# Patient Record
Sex: MALE | Race: BLACK OR AFRICAN AMERICAN | Employment: UNEMPLOYED | URBAN - METROPOLITAN AREA
[De-identification: names, ages, dates, MRNs, and addresses within clinical notes are randomized per-mention and may not be internally consistent; named-entity substitution may affect disease eponyms.]

---

## 2018-02-27 ENCOUNTER — APPOINTMENT (OUTPATIENT)
Dept: GENERAL RADIOLOGY | Age: 23
End: 2018-02-27
Attending: PHYSICIAN ASSISTANT
Payer: SELF-PAY

## 2018-02-27 ENCOUNTER — HOSPITAL ENCOUNTER (EMERGENCY)
Age: 23
Discharge: HOME | End: 2018-02-27
Attending: STUDENT IN AN ORGANIZED HEALTH CARE EDUCATION/TRAINING PROGRAM
Payer: SELF-PAY

## 2018-02-27 VITALS
HEIGHT: 71 IN | OXYGEN SATURATION: 98 % | DIASTOLIC BLOOD PRESSURE: 94 MMHG | SYSTOLIC BLOOD PRESSURE: 167 MMHG | HEART RATE: 81 BPM | BODY MASS INDEX: 33.21 KG/M2 | WEIGHT: 237.25 LBS | TEMPERATURE: 97.9 F | RESPIRATION RATE: 16 BRPM

## 2018-02-27 DIAGNOSIS — S63.502A SPRAIN OF LEFT WRIST, INITIAL ENCOUNTER: Primary | ICD-10-CM

## 2018-02-27 PROCEDURE — 73110 X-RAY EXAM OF WRIST: CPT

## 2018-02-27 PROCEDURE — 74011250637 HC RX REV CODE- 250/637: Performed by: PHYSICIAN ASSISTANT

## 2018-02-27 PROCEDURE — 99283 EMERGENCY DEPT VISIT LOW MDM: CPT

## 2018-02-27 RX ORDER — IBUPROFEN 800 MG/1
800 TABLET ORAL
Qty: 30 TAB | Refills: 0 | Status: SHIPPED | OUTPATIENT
Start: 2018-02-27

## 2018-02-27 RX ORDER — IBUPROFEN 400 MG/1
800 TABLET ORAL
Status: COMPLETED | OUTPATIENT
Start: 2018-02-27 | End: 2018-02-27

## 2018-02-27 RX ADMIN — IBUPROFEN 800 MG: 400 TABLET, FILM COATED ORAL at 12:10

## 2018-02-27 NOTE — DISCHARGE INSTRUCTIONS
Wrist Sprain: Care Instructions  Your Care Instructions    Your wrist hurts because you have stretched or torn ligaments, which connect the bones in your wrist.  Wrist sprains usually take from 2 to 10 weeks to heal, but some take longer. Usually, the more pain you have, the more severe your wrist sprain is and the longer it will take to heal. You can heal faster and regain strength in your wrist with good home treatment. Follow-up care is a key part of your treatment and safety. Be sure to make and go to all appointments, and call your doctor if you are having problems. It's also a good idea to know your test results and keep a list of the medicines you take. How can you care for yourself at home? · Prop up your arm on a pillow when you ice it or anytime you sit or lie down for the next 3 days. Try to keep your wrist above the level of your heart. This will help reduce swelling. · Put ice or cold packs on your wrist for 10 to 20 minutes at a time. Try to do this every 1 to 2 hours for the next 3 days (when you are awake) or until the swelling goes down. Put a thin cloth between the ice pack and your skin. · After 2 or 3 days, if your swelling is gone, apply a heating pad set on low or a warm cloth to your wrist. This helps keep your wrist flexible. Some doctors suggest that you go back and forth between hot and cold. · If you have an elastic bandage, keep it on for the next 24 to 36 hours. The bandage should be snug but not so tight that it causes numbness or tingling. To rewrap the wrist, wrap the bandage around the hand a few times, beginning at the fingers. Then wrap it around the hand between the thumb and index finger, ending by circling the wrist several times. · If your doctor gave you a splint or brace, wear it as directed to protect your wrist until it has healed. · Take pain medicines exactly as directed. ¨ If the doctor gave you a prescription medicine for pain, take it as prescribed.   ¨ If you are not taking a prescription pain medicine, ask your doctor if you can take an over-the-counter medicine. · Try not to use your injured wrist and hand. When should you call for help? Call your doctor now or seek immediate medical care if:  ? · Your hand or fingers are cool or pale or change color. ? Watch closely for changes in your health, and be sure to contact your doctor if:  ? · Your pain gets worse. ? · Your wrist has not improved after 1 week. Where can you learn more? Go to http://mario-monica.info/. Enter G541 in the search box to learn more about \"Wrist Sprain: Care Instructions. \"  Current as of: March 21, 2017  Content Version: 11.4  © 1232-6964 [employer], [employer]. Care instructions adapted under license by [employer] (which disclaims liability or warranty for this information). If you have questions about a medical condition or this instruction, always ask your healthcare professional. [address] any warranty or liability for your use of this information.

## 2018-02-27 NOTE — ED TRIAGE NOTES
Triage Note: Patient complains of left wrist pain after falling while playing basketball. +peripheral pulses.

## 2018-02-27 NOTE — ED PROVIDER NOTES
HPI Comments: 21 y/o left-hand dominant male with no significant PMHx, presenting with complaint of right ear pain and left wrist pain. He states he has had some right-sided ear pain for the past 2 weeks that has been gradually worsening. No associated hearing loss, swelling, redness or drainage from his ear. He denies any trauma to his ear. The patient also complains of left wrist pain. He states he was playing basketball yesterday and fell, landing on his left wrist. He reports having a lot of swelling yesterday, which has improved somewhat today. His pain is 7/10, aching, non-radiating. Aggravating factors include movement; no alleviating factors. He denies weakness, numbness, open wounds, or any other injuries. The history is provided by the patient. History reviewed. No pertinent past medical history. History reviewed. No pertinent surgical history. History reviewed. No pertinent family history. Social History     Social History    Marital status: SINGLE     Spouse name: N/A    Number of children: N/A    Years of education: N/A     Occupational History    Not on file. Social History Main Topics    Smoking status: Current Every Day Smoker     Packs/day: 0.25    Smokeless tobacco: Never Used    Alcohol use No    Drug use: No    Sexual activity: Not on file     Other Topics Concern    Not on file     Social History Narrative    No narrative on file         ALLERGIES: Review of patient's allergies indicates no known allergies. Review of Systems   Constitutional: Negative for chills and fever. HENT: Positive for ear pain. Negative for congestion, ear discharge and hearing loss. Respiratory: Negative for shortness of breath. Cardiovascular: Negative for chest pain. Gastrointestinal: Negative for nausea and vomiting. Musculoskeletal: Positive for arthralgias (left wrist pain). Negative for myalgias. Skin: Negative for wound.    Neurological: Negative for syncope, weakness, light-headedness and numbness. All other systems reviewed and are negative. Vitals:    02/27/18 1154   BP: (!) 167/94   Pulse: 81   Resp: 16   Temp: 97.9 °F (36.6 °C)   SpO2: 98%   Weight: 107.6 kg (237 lb 4 oz)   Height: 5' 11\" (1.803 m)            Physical Exam   Constitutional: He is oriented to person, place, and time. He appears well-developed and well-nourished. No distress. HENT:   Head: Normocephalic and atraumatic. Right Ear: Hearing and external ear normal.   Left Ear: Hearing and external ear normal.   Cerumen occluding external auditory canals bilaterally. Eyes: Conjunctivae and EOM are normal.   Neck: Normal range of motion. Neck supple. Cardiovascular: Normal rate and regular rhythm. Pulses:       Radial pulses are 2+ on the right side, and 2+ on the left side. Pulmonary/Chest: Effort normal. No respiratory distress. Musculoskeletal: Normal range of motion. Left wrist with generalized tenderness to palpation. No appreciable swelling, erythema or deformity. No tenderness of elbow, forearm, hand or fingers. Fingers are warm with intact light touch sensation. Neurological: He is alert and oriented to person, place, and time. Skin: Skin is warm and dry. He is not diaphoretic. Nursing note and vitals reviewed. MDM  Number of Diagnoses or Management Options  Sprain of left wrist, initial encounter:      Amount and/or Complexity of Data Reviewed  Tests in the radiology section of CPT®: ordered and reviewed  Independent visualization of images, tracings, or specimens: yes (XR left wrist)    Patient Progress  Patient progress: stable        ED Course       Procedures      21 y/o left-hand dominant male with no significant PMHx, presenting with complaint of right ear pain and left wrist pain. Physical exam reveals cerumen impaction, likely cause of right ear pain.  No swelling or tenderness of pinna/tragus, no exudate or discharge in external auditory canal. Left wrist physical exam suggestive of sprain, but will obtain XR to evaluate for possible fractures. XR left wrist, read by radiology and independently visualized and interpreted by myself, reveals no evidence of acute fractures or traumatic malalignment. Safe for discharge home with wrist immobilizer and Rx for ibuprofen. Recommended rest, ice and elevation, as well as OTC Debrox for cerumen impaction. The patient was given resources to establish care with a PCP as needed for follow up. Strict ED return precautions discussed and provided in writing at time of discharge. The patient verbalized understanding and agreement with this plan.

## 2018-02-27 NOTE — LETTER
[address] 
[address] [place], [place] [address] [id number] 
[phone] Work/School Note Date: 2/27/2018 To Whom It May concern: 
 
Dudley Aleman was seen and treated today in the emergency room by the following provider(s): 
Attending Provider: Bryanna Baez MD 
Physician Assistant: VIDA Borja. Dudley Aleman may return to work on 3/1/18. Sincerely, VIDA Borja

## 2019-10-24 ENCOUNTER — ED HISTORICAL/CONVERTED ENCOUNTER (OUTPATIENT)
Dept: OTHER | Age: 24
End: 2019-10-24

## 2020-05-07 LAB — SARS-COV-2, NAA: NOT DETECTED

## 2020-05-11 ENCOUNTER — TELEPHONE (OUTPATIENT)
Dept: INTERNAL MEDICINE CLINIC | Age: 25
End: 2020-05-11

## 2020-05-11 ENCOUNTER — VIRTUAL VISIT (OUTPATIENT)
Dept: INTERNAL MEDICINE CLINIC | Age: 25
End: 2020-05-11

## 2020-05-11 VITALS — HEIGHT: 71 IN | WEIGHT: 235 LBS | BODY MASS INDEX: 32.9 KG/M2

## 2020-05-11 DIAGNOSIS — H53.9 VISION CHANGES: ICD-10-CM

## 2020-05-11 DIAGNOSIS — R03.0 ELEVATED BLOOD PRESSURE READING: Primary | ICD-10-CM

## 2020-05-11 NOTE — TELEPHONE ENCOUNTER
Writer spoke with patient and we can for now send a copy of the covid results to his home address and patient states he is not on any medications, that he probably was on these a long time ago back in high school.

## 2020-05-11 NOTE — PROGRESS NOTES
Moody Mcburney is a 30 y.o. male who was seen by synchronous (real-time) audio-video technology on 5/11/2020. Consent: Moody Mcburney, who was seen by synchronous (real-time) audio-video technology, and/or his healthcare decision maker, is aware that this patient-initiated, Telehealth encounter on 5/11/2020 is a billable service, with coverage as determined by his insurance carrier. He is aware that he may receive a bill and has provided verbal consent to proceed: Yes. Assessment & Plan:   Diagnoses and all orders for this visit:    1. Elevated blood pressure reading    2. Vision changes      The patient I spoke about his hospital visit to [facility]. I will try to get that report so that I can see what lab work was done. Also I will get the results of his COVID-19 testing. Patient reports he will need documentation of this in order for him to return back to work. The patient will be contacted with further instructions on how to get a copy of his test results. Meanwhile I have asked the patient to please purchase a blood pressure cuff for at home. I would like for him to take his blood pressure twice a day for the next week and to make a follow-up appointment in a week to discuss his results. Patient has stated today that his vision is back to normal.  Patient reports that he has a very physical job with lots of lifting and pulling and moving throughout the day. Patient admits that his diet is not always the healthiest.  Patient should schedule follow-up appointment in 1 week    I spent at least 45 minutes on this visit with this new patient. ([phone]  Subjective:   Moody Mcburney is a 30 y.o. male who was seen for Establish Care  Patient presents today to get establish as well as to follow-up her recent visit to [facility].  Mr. Zaynab Crowe reports he has not seen a primary care physician since he was probably in high school. At that time he was seen Dr. Tiffani Fletcher.   Chiara Zaynab Crowe reports that he was seen at [facility] last Friday because he was having some lightheadedness as well as blurred vision when looking at things from a distance. He reports that he was checked for COVID-19 and was eventually called and told that he was negative. Patient reports that they did a visual acuity test on him at the hospital as well as they did a chest x-ray and some blood work. He reports that he was told that everything checked out okay and that perhaps he was having the symptoms in relationship to some drugs. Mr. Zaynab Crowe admits that he does smoke marijuana and the last time he had some was about 2 days ago. He reports he has a history of reflux and currently he takes Tums 1-2 times a week. He reports that he is concerned that he may also have hypertension. He states he was told that his blood pressure was elevated when he was at the hospital but he believes it returned to normal by the time he was discharged. Prior to Admission medications    Not on File     No Known Allergies  Past Medical History:   Diagnosis Date    Anxiety     Depression     GERD (gastroesophageal reflux disease)      No past surgical history on file.   Family History   Problem Relation Age of Onset    Depression Mother     Hypertension Mother     Stroke Maternal Uncle     Diabetes Maternal Uncle     Diabetes Cousin      Social History     Socioeconomic History    Marital status: SINGLE     Spouse name: Not on file    Number of children: Not on file    Years of education: Not on file    Highest education level: Not on file   Occupational History     Comment:    Tobacco Use    Smoking status: Current Every Day Smoker     Packs/day: 0.25     Years: 7.00     Pack years: 1.75     Types: Cigarettes     Start date: 5/11/2013    Smokeless tobacco: Never Used   Substance and Sexual Activity    Alcohol use: Not Currently    Drug use: Yes     Frequency: 1.0 times per week     Types: Marijuana    Sexual activity: Yes     Partners: Female     Birth control/protection: None     Comment: using BC         ROS  See above    Objective:     Visit Vitals  Ht 5' 11\" (1.803 m)   Wt 235 lb (106.6 kg)   BMI 32.78 kg/m²      General: alert, cooperative, no distress   Mental  status: normal mood, behavior, speech, dress, motor activity, and thought processes, able to follow commands   HENT: NCAT   Neck: no visualized mass   Resp: no respiratory distress   Neuro: no gross deficits   Skin: no discoloration or lesions of concern on visible areas   Psychiatric: normal affect, consistent with stated mood, no evidence of hallucinations     Additional exam findings: We discussed the expected course, resolution and complications of the diagnosis(es) in detail. Medication risks, benefits, costs, interactions, and alternatives were discussed as indicated. I advised him to contact the office if his condition worsens, changes or fails to improve as anticipated. He expressed understanding with the diagnosis(es) and plan. Sophia Tello is a 30 y.o. male who was evaluated by a video visit encounter for concerns as above. Patient identification was verified prior to start of the visit. A caregiver was present when appropriate. Due to this being a TeleHealth encounter ([facility] public health emergency), evaluation of the following organ systems was limited: Vitals/Constitutional/EENT/Resp/CV/GI//MS/Neuro/Skin/Heme-Lymph-Imm. Pursuant to the emergency declaration under the [facility] waiver authority and the [employer] and [employer] Act, this Virtual  Visit was conducted, with patient's (and/or legal guardian's) consent, to reduce the patient's risk of exposure to COVID-19 and provide necessary medical care. Services were provided through a video synchronous discussion virtually to substitute for in-person clinic visit. Patient and provider were located at their individual homes.       Jaye Metzger PA-C

## 2020-05-11 NOTE — PROGRESS NOTES
Patient tested negative for COVID. No other concerns.  Previous PCP, Dr. Bryan Denson, [place], [place].

## 2020-05-11 NOTE — TELEPHONE ENCOUNTER
Please contact the patient and let him know I have a copy of his covid-19 results. Please find out where he would like for us to send the results. Also ask him about taking fluoxetine, hydroxyzine, or the trazodone.  Has he seen psych in the past? thanks

## 2020-07-08 ENCOUNTER — HOSPITAL ENCOUNTER (INPATIENT)
Age: 25
LOS: 2 days | Discharge: HOME | DRG: 754 | End: 2020-07-10
Attending: PSYCHIATRY & NEUROLOGY | Admitting: PSYCHIATRY & NEUROLOGY
Payer: MEDICAID

## 2020-07-08 ENCOUNTER — HOSPITAL ENCOUNTER (EMERGENCY)
Age: 25
Discharge: ANOTHER HOSPITAL | End: 2020-07-08
Attending: EMERGENCY MEDICINE | Admitting: EMERGENCY MEDICINE
Payer: MEDICAID

## 2020-07-08 VITALS
DIASTOLIC BLOOD PRESSURE: 88 MMHG | OXYGEN SATURATION: 99 % | TEMPERATURE: 98.1 F | SYSTOLIC BLOOD PRESSURE: 128 MMHG | RESPIRATION RATE: 16 BRPM | HEART RATE: 69 BPM

## 2020-07-08 DIAGNOSIS — F32.A DEPRESSION, UNSPECIFIED DEPRESSION TYPE: Primary | ICD-10-CM

## 2020-07-08 LAB
ALBUMIN SERPL-MCNC: 4.3 G/DL (ref 3.5–5)
ALBUMIN/GLOB SERPL: 1.1 {RATIO} (ref 1.1–2.2)
ALP SERPL-CCNC: 80 U/L (ref 45–117)
ALT SERPL-CCNC: 37 U/L (ref 12–78)
AMPHET UR QL SCN: NEGATIVE
ANION GAP SERPL CALC-SCNC: 6 MMOL/L (ref 5–15)
APAP SERPL-MCNC: <2 UG/ML (ref 10–30)
APPEARANCE UR: CLEAR
AST SERPL-CCNC: NORMAL U/L (ref 15–37)
BACTERIA URNS QL MICRO: NEGATIVE /HPF
BARBITURATES UR QL SCN: NEGATIVE
BASOPHILS # BLD: 0 K/UL (ref 0–0.1)
BASOPHILS NFR BLD: 0 % (ref 0–1)
BENZODIAZ UR QL: NEGATIVE
BILIRUB SERPL-MCNC: 0.5 MG/DL (ref 0.2–1)
BILIRUB UR QL: NEGATIVE
BUN SERPL-MCNC: 15 MG/DL (ref 6–20)
BUN/CREAT SERPL: 15 (ref 12–20)
CALCIUM SERPL-MCNC: 9.1 MG/DL (ref 8.5–10.1)
CANNABINOIDS UR QL SCN: POSITIVE
CHLORIDE SERPL-SCNC: 107 MMOL/L (ref 97–108)
CO2 SERPL-SCNC: 23 MMOL/L (ref 21–32)
COCAINE UR QL SCN: NEGATIVE
COLOR UR: NORMAL
CREAT SERPL-MCNC: 1.03 MG/DL (ref 0.7–1.3)
DIFFERENTIAL METHOD BLD: NORMAL
DRUG SCRN COMMENT,DRGCM: ABNORMAL
EOSINOPHIL # BLD: 0 K/UL (ref 0–0.4)
EOSINOPHIL NFR BLD: 0 % (ref 0–7)
EPITH CASTS URNS QL MICRO: NORMAL /LPF
ERYTHROCYTE [DISTWIDTH] IN BLOOD BY AUTOMATED COUNT: 13.3 % (ref 11.5–14.5)
ETHANOL SERPL-MCNC: <10 MG/DL
GLOBULIN SER CALC-MCNC: 3.8 G/DL (ref 2–4)
GLUCOSE SERPL-MCNC: 95 MG/DL (ref 65–100)
GLUCOSE UR STRIP.AUTO-MCNC: NEGATIVE MG/DL
HCT VFR BLD AUTO: 46.4 % (ref 36.6–50.3)
HGB BLD-MCNC: 15 G/DL (ref 12.1–17)
HGB UR QL STRIP: NEGATIVE
HYALINE CASTS URNS QL MICRO: NORMAL /LPF (ref 0–5)
IMM GRANULOCYTES # BLD AUTO: 0 K/UL (ref 0–0.04)
IMM GRANULOCYTES NFR BLD AUTO: 0 % (ref 0–0.5)
KETONES UR QL STRIP.AUTO: NEGATIVE MG/DL
LEUKOCYTE ESTERASE UR QL STRIP.AUTO: NEGATIVE
LYMPHOCYTES # BLD: 1.2 K/UL (ref 0.8–3.5)
LYMPHOCYTES NFR BLD: 24 % (ref 12–49)
MCH RBC QN AUTO: 28.8 PG (ref 26–34)
MCHC RBC AUTO-ENTMCNC: 32.3 G/DL (ref 30–36.5)
MCV RBC AUTO: 89.1 FL (ref 80–99)
METHADONE UR QL: NEGATIVE
MONOCYTES # BLD: 0.3 K/UL (ref 0–1)
MONOCYTES NFR BLD: 7 % (ref 5–13)
NEUTS SEG # BLD: 3.4 K/UL (ref 1.8–8)
NEUTS SEG NFR BLD: 69 % (ref 32–75)
NITRITE UR QL STRIP.AUTO: NEGATIVE
NRBC # BLD: 0 K/UL (ref 0–0.01)
NRBC BLD-RTO: 0 PER 100 WBC
OPIATES UR QL: NEGATIVE
PCP UR QL: NEGATIVE
PH UR STRIP: 6 [PH] (ref 5–8)
PLATELET # BLD AUTO: 188 K/UL (ref 150–400)
PMV BLD AUTO: 11.5 FL (ref 8.9–12.9)
POTASSIUM SERPL-SCNC: NORMAL MMOL/L (ref 3.5–5.1)
PROT SERPL-MCNC: 8.1 G/DL (ref 6.4–8.2)
PROT UR STRIP-MCNC: NEGATIVE MG/DL
RBC # BLD AUTO: 5.21 M/UL (ref 4.1–5.7)
RBC #/AREA URNS HPF: NORMAL /HPF (ref 0–5)
SALICYLATES SERPL-MCNC: 3.1 MG/DL (ref 2.8–20)
SODIUM SERPL-SCNC: 136 MMOL/L (ref 136–145)
SP GR UR REFRACTOMETRY: 1.01 (ref 1–1.03)
UR CULT HOLD, URHOLD: NORMAL
UROBILINOGEN UR QL STRIP.AUTO: 0.2 EU/DL (ref 0.2–1)
WBC # BLD AUTO: 4.9 K/UL (ref 4.1–11.1)
WBC URNS QL MICRO: NORMAL /HPF (ref 0–4)

## 2020-07-08 PROCEDURE — 80307 DRUG TEST PRSMV CHEM ANLYZR: CPT

## 2020-07-08 PROCEDURE — 81001 URINALYSIS AUTO W/SCOPE: CPT

## 2020-07-08 PROCEDURE — 99284 EMERGENCY DEPT VISIT MOD MDM: CPT

## 2020-07-08 PROCEDURE — 80053 COMPREHEN METABOLIC PANEL: CPT

## 2020-07-08 PROCEDURE — 74011250637 HC RX REV CODE- 250/637: Performed by: PHYSICIAN ASSISTANT

## 2020-07-08 PROCEDURE — 85025 COMPLETE CBC W/AUTO DIFF WBC: CPT

## 2020-07-08 PROCEDURE — 90791 PSYCH DIAGNOSTIC EVALUATION: CPT

## 2020-07-08 PROCEDURE — 65220000003 HC RM SEMIPRIVATE PSYCH

## 2020-07-08 PROCEDURE — 36415 COLL VENOUS BLD VENIPUNCTURE: CPT

## 2020-07-08 RX ORDER — HYDROXYZINE 25 MG/1
50 TABLET, FILM COATED ORAL
Status: DISCONTINUED | OUTPATIENT
Start: 2020-07-08 | End: 2020-07-10 | Stop reason: HOSPADM

## 2020-07-08 RX ORDER — IBUPROFEN 200 MG
1 TABLET ORAL DAILY
Status: DISCONTINUED | OUTPATIENT
Start: 2020-07-09 | End: 2020-07-10 | Stop reason: HOSPADM

## 2020-07-08 RX ORDER — PHENOBARBITAL 32.4 MG/1
32.4 TABLET ORAL
Status: DISCONTINUED | OUTPATIENT
Start: 2020-07-08 | End: 2020-07-09

## 2020-07-08 RX ORDER — CLONIDINE HYDROCHLORIDE 0.1 MG/1
0.1 TABLET ORAL
Status: COMPLETED | OUTPATIENT
Start: 2020-07-08 | End: 2020-07-08

## 2020-07-08 RX ORDER — HALOPERIDOL 5 MG/ML
5 INJECTION INTRAMUSCULAR
Status: DISCONTINUED | OUTPATIENT
Start: 2020-07-08 | End: 2020-07-10 | Stop reason: HOSPADM

## 2020-07-08 RX ORDER — TRAZODONE HYDROCHLORIDE 50 MG/1
50 TABLET ORAL
Status: DISCONTINUED | OUTPATIENT
Start: 2020-07-08 | End: 2020-07-10 | Stop reason: HOSPADM

## 2020-07-08 RX ORDER — DIPHENHYDRAMINE HYDROCHLORIDE 50 MG/ML
50 INJECTION, SOLUTION INTRAMUSCULAR; INTRAVENOUS
Status: DISCONTINUED | OUTPATIENT
Start: 2020-07-08 | End: 2020-07-10 | Stop reason: HOSPADM

## 2020-07-08 RX ORDER — DICYCLOMINE HYDROCHLORIDE 10 MG/ML
20 INJECTION INTRAMUSCULAR
Status: DISCONTINUED | OUTPATIENT
Start: 2020-07-08 | End: 2020-07-10 | Stop reason: HOSPADM

## 2020-07-08 RX ORDER — LORAZEPAM 0.5 MG/1
1 TABLET ORAL
Status: COMPLETED | OUTPATIENT
Start: 2020-07-08 | End: 2020-07-08

## 2020-07-08 RX ORDER — OLANZAPINE 5 MG/1
5 TABLET ORAL
Status: DISCONTINUED | OUTPATIENT
Start: 2020-07-08 | End: 2020-07-10 | Stop reason: HOSPADM

## 2020-07-08 RX ORDER — IBUPROFEN 400 MG/1
400 TABLET ORAL
Status: DISCONTINUED | OUTPATIENT
Start: 2020-07-08 | End: 2020-07-10 | Stop reason: HOSPADM

## 2020-07-08 RX ORDER — BENZTROPINE MESYLATE 1 MG/1
1 TABLET ORAL
Status: DISCONTINUED | OUTPATIENT
Start: 2020-07-08 | End: 2020-07-10 | Stop reason: HOSPADM

## 2020-07-08 RX ORDER — ACETAMINOPHEN 325 MG/1
650 TABLET ORAL
Status: DISCONTINUED | OUTPATIENT
Start: 2020-07-08 | End: 2020-07-10 | Stop reason: HOSPADM

## 2020-07-08 RX ORDER — ADHESIVE BANDAGE
30 BANDAGE TOPICAL DAILY PRN
Status: DISCONTINUED | OUTPATIENT
Start: 2020-07-08 | End: 2020-07-10 | Stop reason: HOSPADM

## 2020-07-08 RX ORDER — LORAZEPAM 2 MG/ML
1 INJECTION INTRAMUSCULAR
Status: DISCONTINUED | OUTPATIENT
Start: 2020-07-08 | End: 2020-07-10 | Stop reason: HOSPADM

## 2020-07-08 RX ORDER — LOPERAMIDE HYDROCHLORIDE 2 MG/1
2 CAPSULE ORAL AS NEEDED
Status: DISCONTINUED | OUTPATIENT
Start: 2020-07-08 | End: 2020-07-10 | Stop reason: HOSPADM

## 2020-07-08 RX ADMIN — CLONIDINE HYDROCHLORIDE 0.1 MG: 0.1 TABLET ORAL at 17:25

## 2020-07-08 RX ADMIN — LORAZEPAM 1 MG: 0.5 TABLET ORAL at 13:58

## 2020-07-08 RX ADMIN — LORAZEPAM 1 MG: 0.5 TABLET ORAL at 17:25

## 2020-07-08 RX ADMIN — CLONIDINE HYDROCHLORIDE 0.1 MG: 0.1 TABLET ORAL at 13:58

## 2020-07-08 NOTE — ED PROVIDER NOTES
69-year-old male history of anxiety, depression presenting to the ER for mental health problem. Patient reports that for the last several months he has been feeling increasingly depressed, has also had some issues with anxiety. Patient admits that within the last month he has started using intranasal heroin again. When asked about suicidal ideation, states \"I do not know. \"  Denies HI. Last admission for mental health was in 2013 to [facility]. Denies any fever, chills, vomiting, chest pain, shortness of breath or any other recent illness or medical symptoms. Past medical history: As above  Social history: As above. Smokes cigarettes, occasional alcohol use. Works in a [employer]. Past Medical History:   Diagnosis Date    Anxiety     Depression     GERD (gastroesophageal reflux disease)        History reviewed. No pertinent surgical history.       Family History:   Problem Relation Age of Onset    Depression Mother     Hypertension Mother     Stroke Maternal Uncle     Diabetes Maternal Uncle     Diabetes Cousin        Social History     Socioeconomic History    Marital status: SINGLE     Spouse name: Not on file    Number of children: Not on file    Years of education: Not on file    Highest education level: Not on file   Occupational History     Comment:    Social Needs    Financial resource strain: Not on file    Food insecurity     Worry: Not on file     Inability: Not on file    Transportation needs     Medical: Not on file     Non-medical: Not on file   Tobacco Use    Smoking status: Current Every Day Smoker     Packs/day: 1.00     Years: 7.00     Pack years: 7.00     Types: Cigarettes     Start date: 5/11/2013    Smokeless tobacco: Never Used   Substance and Sexual Activity    Alcohol use: Not Currently    Drug use: Yes     Frequency: 1.0 times per week     Types: Marijuana, Heroin    Sexual activity: Yes     Partners: Female     Birth control/protection: None     Comment: using BC   Lifestyle    Physical activity     Days per week: Not on file     Minutes per session: Not on file    Stress: Not on file   Relationships    Social connections     Talks on phone: Not on file     Gets together: Not on file     Attends [employer] service: Not on file     Active member of club or organization: Not on file     Attends meetings of clubs or organizations: Not on file     Relationship status: Not on file    Intimate partner violence     Fear of current or ex partner: Not on file     Emotionally abused: Not on file     Physically abused: Not on file     Forced sexual activity: Not on file   Other Topics Concern    Not on file   Social History Narrative    Not on file         ALLERGIES: Patient has no known allergies. Review of Systems   Constitutional: Negative for fever. HENT: Negative for facial swelling. Respiratory: Negative for shortness of breath. Cardiovascular: Negative for chest pain. Gastrointestinal: Negative for vomiting. Skin: Negative for wound. Neurological: Negative for syncope. Psychiatric/Behavioral: Positive for dysphoric mood. The patient is nervous/anxious. All other systems reviewed and are negative. Vitals:    07/08/20 1125 07/08/20 1536   BP: 144/83 138/84   Pulse: 68 66   Resp: 16 16   Temp: 98 °F (36.7 °C) 98 °F (36.7 °C)   SpO2: 99% 99%            Physical Exam  Vitals signs and nursing note reviewed. Constitutional:       Appearance: He is well-developed. Comments: Pleasant black male   HENT:      Head: Normocephalic. Eyes:      Conjunctiva/sclera: Conjunctivae normal.   Neck:      Musculoskeletal: Neck supple. Cardiovascular:      Rate and Rhythm: Normal rate. Pulmonary:      Effort: Pulmonary effort is normal. No respiratory distress. Musculoskeletal: Normal range of motion. Skin:     General: Skin is warm and dry.    Neurological:      Mental Status: He is alert and oriented to person, place, and time. MDM  Number of Diagnoses or Management Options  Diagnosis management comments: 24-year-old male history of drug use, depression, presenting to the ED for worsening depression. No medical complaints. Patient to be seen by LCSW. Patient to be admitted to psychiatry.        Amount and/or Complexity of Data Reviewed  Discuss the patient with other providers: yes ( [facility] ED attending)           Procedures

## 2020-07-08 NOTE — BSMART NOTE
Patient has been accepted to [facility] by William Desouza NP. Call [phone] for report. Advised VIDA Santamaria and RN Duane Raja, of same.

## 2020-07-08 NOTE — ED NOTES
MABLE signed and completed, scanned in by . Patient wheeled out of ED by ELIS page. PIV removed. No signs of acute distress noted. VSS.

## 2020-07-08 NOTE — ED TRIAGE NOTES
Patient presents from home with  complaints of \"I don't want to be here\". Patient reports he has been upset, modi, and depressed for months. Patient reports he did see a therapist 3 weeks ago, but she became ill and he has not seen her since. Patient reports \"i'm not good\". Patient reports he started using heroin and this is when those feelings started. Patient reports that yesterday was the first day he didn't use and he had been using everyday for a month. Patient reports he had an accidental overdose last year and then he stopped for a while. Patient is unable to give RN a concrete answer on SI. He denies HI though.     Patient agrees to contract for safety at this time and his mother is coming back to the room with him

## 2020-07-08 NOTE — BSMART NOTE
Comprehensive Assessment Form Part 1 Section I - Disposition Axis I - Major Depressive Disorder, Recurrent, Severe, No Psychosis, Opiate Use Disorder Axis II - Deferred Axis III - Past Medical History:  
Diagnosis Date  Anxiety  Depression  GERD (gastroesophageal reflux disease) The Medical Doctor to Psychiatrist conference was not completed. The Medical Doctor is in agreement with Psychiatrist disposition because of (reason) Patient could benefit from admission. The plan is present for admission. The on-call Psychiatrist consulted was Dr. Perri Swartz. The admitting Psychiatrist will be Dr. Darrell Carranza. The admitting Diagnosis is Major Depression and Opiate Use Disorder. The Payor source is [employer]. Section II - Integrated Summary Summary:  Patient came in accompanied by mom for evaluation for depression and suicidal ideation. Patient denied any one precipitant for his visit today but reported history of depression that has been worse recently to include loss of about 20 lbs, poor sleep, feeling helpless, and hopeless, having regrets, and suicidal thoughts. Patient reported he is not currently employed and has had a hard time maintaining employment due to depression and legal past.  Patient reportedly spent 3 years incarcerated for robbery in 2013 after his psychiatric admission. He was on medications during his incarceration and believed them to be helpful. Patient also feels he is attempting to cope with drugs. Patient reported he has been admitted to [address] [place] [address] previously in 2013 for similar circumstances. He also reported being on medications in the past but unable to recall what medications. He is currently seeing a therapist at [facility] named Torrey Flores LCSW ([phone])  but missed his last session with her. No current medications. Patient is alert, oriented, and cooperative. Eye contact was poor.   Mood was depressed and patient was tearful at times. Affect was flat. Speech was logical and appropriate. Patient reported loss of about 20 lbs, poor sleep, depressed mood, regretful and hopeless/helpless feelings. Patient indicated intermittent suicidal thoughts without a plan. Mom verbalized concern that she did not know what would happen if he left here today. Patient denied any prior suicide attempts. Patient further denied homicidal ideation or history of violence. There was no evidence of psychosis and patient denied hallucinations. He did report he visualized himself dead at times. Patient initially appeared reluctant to agree to admission. Patient discussed with his mother who felt it was the only solution. Mom reported patient is in bed a lot, is struggling, and she and other family help him with his daughter at times due to his depression. She also reported that his apartment was unorganized. She voiced concerns about his suicidal ideation as well. Processed other possible options for safety planning but patient decided to request voluntary admission at this time. He is motivated and wants to address his depression and substance abuse issues. The patienthas demonstrated mental capacity to provide informed consent. The information is given by the patient, parent and past medical records. The Chief Complaint is depression and suicidal ideation. The Precipitant Factors are SA, unemployment, past hx of legal involvement. Previous Hospitalizations: Yes The patient has not previously been in restraints. Current Psychiatrist and/or  is Jacinto Perez- [facility]. Lethality Assessment: 
 
The potential for suicide noted by the following: current substance abuse . Denied current suicidal thoughts and denied past hx of attempts. Had suicidal thoughts 2 days ago without plan. The potential for homicide is not noted. The patient has not been a perpetrator of sexual or physical abuse. There are pending charges and are listed as: NA. The patient is not felt to be at risk for self harm or harm to others. The attending nurse was advised that security has not been notified. Section III - Psychosocial 
The patient's overall mood and attitude is depressed. Feelings of helplessness and hopelessness are observed by verbal statements. Generalized anxiety is not observed. Panic is not observed. Phobias are not observed. Obsessive compulsive tendencies are not observed. Section IV - Mental Status Exam 
The patient's appearance shows no evidence of impairment. The patient's behavior shows no evidence of impairment. The patient is oriented to time, place, person and situation. The patient's speech is soft. The patient's mood is depressed. The range of affect is flat. The patient's thought content demonstrates no evidence of impairment. The thought process shows no evidence of impairment. The patient's perception shows no evidence of impairment. The patient's memory shows no evidence of impairment. The patient's appetite is decreased and shows signs of weight loss. The patient's sleep has evidence of insomnia. The patient shows little insight. The patient's judgement is psychologically impaired. Section V - Substance Abuse The patient is using substances. The patient is using tobacco by inhalation with last use on unk, cannabis by inhalation for greater than 10 years with last use on this am and heroin snorts for 1-5 years with last use on 2 days ago. The patient has experienced the following withdrawal symptoms: diarrhea, chills, sleep disturbance and restless, feels as if things crawling on body. Section VI - Living Arrangements The patient is single. The patient lives alone. The patient has 1 child age 3 yo. The patient does plan to return home upon discharge. The patient does have legal issues pending. The patient's source of income comes from unemployment. Restorationism and cultural practices have not been voiced at this time. The patient's greatest support comes from mother and this person will not be involved with the treatment. The patient has not been in an event described as horrible or outside the realm of ordinary life experience either currently or in the past. 
The patient has not been a victim of sexual/physical abuse. Section VII - Other Areas of Clinical Concern The highest grade achieved is some college with the overall quality of school experience being described as NA. The patient is currently unemployed and speaks [place] as a primary language. The patient has no communication impairments affecting communication. The patient's preference for learning can be described as: can read and write adequately.   The patient's hearing is normal.  The patient's vision is normal. 
 
 
Caty Tompkins, [facility]

## 2020-07-09 PROBLEM — F32.9 MAJOR DEPRESSIVE DISORDER: Status: ACTIVE | Noted: 2020-07-09

## 2020-07-09 PROCEDURE — 74011250637 HC RX REV CODE- 250/637: Performed by: NURSE PRACTITIONER

## 2020-07-09 PROCEDURE — 65220000003 HC RM SEMIPRIVATE PSYCH

## 2020-07-09 RX ORDER — FLUOXETINE HYDROCHLORIDE 20 MG/1
20 CAPSULE ORAL DAILY
Status: DISCONTINUED | OUTPATIENT
Start: 2020-07-09 | End: 2020-07-10 | Stop reason: HOSPADM

## 2020-07-09 RX ADMIN — TRAZODONE HYDROCHLORIDE 50 MG: 50 TABLET ORAL at 22:12

## 2020-07-09 RX ADMIN — FLUOXETINE 20 MG: 20 CAPSULE ORAL at 15:41

## 2020-07-09 RX ADMIN — HYDROXYZINE HYDROCHLORIDE 50 MG: 25 TABLET, FILM COATED ORAL at 22:12

## 2020-07-09 RX ADMIN — HYDROXYZINE HYDROCHLORIDE 50 MG: 25 TABLET, FILM COATED ORAL at 11:42

## 2020-07-09 NOTE — PROGRESS NOTES
[facility] Admission Pharmacy Medication Reconciliation     Information obtained from: Patient interview, chart review  RxQuery data available1: No    Comments/recommendations:    1) Reports being on medication for blood pressure about 1 year ago. Not on any medications currently. 2) Medication changes (since last review): None    3) Confirmed preferred outpatient pharmacy with patient. 1RxQuery pharmacy benefit data reflects medications filled and processed through the patient's insurance, however                this data does NOT capture whether the medication was picked up or is currently being taken by the patient. Total Time Spent: 5 minutes    Past Medical History/Disease States:  Past Medical History:   Diagnosis Date    Anxiety     Depression     GERD (gastroesophageal reflux disease)        Patient allergies:    Allergies as of 07/08/2020    (No Known Allergies)       Prior to admission medications:   None       Thank you,  Kosta Singleton, PHARMD, [facility], [address] [place] [address] [address]: [phone] ([id number])  Pharmacy: [phone] ([id number])

## 2020-07-09 NOTE — PROGRESS NOTES
Patient is alert and oriented x4. Patient denied depression and anxiety during morning assessment. Patient denies SI, HI, AVH. Patient is calm, cooperative and participated in treatment team. Patient reports wanting to receive outpatient therapy upon discharge. Patient reported eating about 25% of meals and ate his snacks. 1142-Patient reported having anxiety and was administered PRN Atarax at 1142.     1242-Patient reported effectiveness and a decrease in anxiety. Patient in dayroom on the phone with mother. 1857-Patient has maintained an optimistic attitude throughout the day and was visible in the hallways and dayroom. Patient has interacted well with staff and is looking forward to discharge tomorrow with continued mental health services upon discharge. Q15 minute checks continued for safety.      Problem: Depressed Mood (Adult/Pediatric)  Goal: *STG: Participates in treatment plan  Outcome: Progressing Towards Goal  Goal: *STG: Demonstrates reduction in symptoms and increase in insight into coping skills/future focused  Outcome: Progressing Towards Goal  Goal: *STG: Remains safe in hospital  Outcome: Progressing Towards Goal

## 2020-07-09 NOTE — DISCHARGE INSTR - DIET
· Good nutrition is important when healing from an illness, injury, or surgery. Follow any nutrition recommendations given to you during your hospital stay. · If you were given an oral nutrition supplement while in the hospital, continue to take this supplement at home. You can take it with meals, in-between meals, and/or before bedtime. These supplements can be purchased at most local grocery stores, pharmacies, and chain super-stores. · If you have any questions about your diet or nutrition, call the hospital and ask for the dietitian.

## 2020-07-09 NOTE — BH NOTES
GROUP THERAPY PROGRESS NOTE    Patient did not participate in Process group.      Ardath Side LPC [facility]

## 2020-07-09 NOTE — BH NOTES
Admit Note:  Received Gregg, a large built 30 yr old  male, to [facility] via stretcher and accompanied by security on a voluntary status. Pt presented with neat, clean appearance, blunted affect and depressed mood. He was cooperative with the admit process including the paperwork and the skin assessment which was done by Nikki Ríos RN and Charles Prieto RN. Pt has tattoos but no wounds or other skin issues. BP:  122/75, P:  74, R:  16, T:  97.4, O2 sat:  99%. Ht:  5'11\", wt:  221.5 lbs on the standing scale. On admit Gregg denied SI but stated he has been suicidal over the last few days but with no plan. He stated, \"Sometimes I see myself dead. \"  He also acknowledged depression with anhedonia, hopeless feelings, a weight loss of 20 lbs over the last month, decrease in sleep from the normal being 6 hrs to 2-4 hrs /night. He was vague and guarded as to the stressors preceding the suicidal thoughts. Pt stated he lives with his girlfriend and things are not perfect but stated, 'they are getting there. \"  He expressed guilt r/t relapsing on heroin which he stated he has used $20 daily for the last month and is using \"weed\" 1/2 blunt several times per week. On admit he was asking if Ativan could be ordered for withdrawals. He reported some mild feelings of crawling on his right side. Pt was provided with food and was oriented to the unit, was instructed on his admit package. He immediately went to bed soon after admit. Pt will be monitored Q 15 min and as needed for safety, mood chgs, withdrawals. Addendum at :  Pt has slept 8 hrs. No problems during the shift, no s/s of withdrawals.

## 2020-07-09 NOTE — PROGRESS NOTES
Problem: Depressed Mood (Adult/Pediatric)  Goal: *STG: Participates in treatment plan  Outcome: Progressing Towards Goal  Goal: *STG: Remains safe in hospital  Outcome: Progressing Towards Goal     Problem: Falls - Risk of  Goal: *Absence of Falls  Description: Document Justa Fall Risk and appropriate interventions in the flowsheet.   Outcome: Progressing Towards Goal  Note: Fall Risk Interventions:

## 2020-07-09 NOTE — PROGRESS NOTES
Reg diet as tolerated. Double portions ordered to help meet ~ needs. Hx notable only for elev BMI.   Ht: 5'11'  Wt; 235 lb  BMI: 32.78 kg(m^2) c/w obesity grade I  Est energy needs: 2055 kcal, 70 g protein, 2255 mL fluids  Pt will consume > 75% of meals at follow up 7-10 days  MST, LOS

## 2020-07-10 VITALS
SYSTOLIC BLOOD PRESSURE: 134 MMHG | HEART RATE: 72 BPM | BODY MASS INDEX: 32.78 KG/M2 | DIASTOLIC BLOOD PRESSURE: 87 MMHG | HEIGHT: 71 IN | OXYGEN SATURATION: 98 % | TEMPERATURE: 97.8 F | RESPIRATION RATE: 18 BRPM

## 2020-07-10 PROCEDURE — 74011250637 HC RX REV CODE- 250/637: Performed by: NURSE PRACTITIONER

## 2020-07-10 RX ORDER — TRAZODONE HYDROCHLORIDE 50 MG/1
50 TABLET ORAL
Qty: 30 TAB | Refills: 0 | Status: SHIPPED | OUTPATIENT
Start: 2020-07-10

## 2020-07-10 RX ORDER — HYDROXYZINE 50 MG/1
50 TABLET, FILM COATED ORAL
Qty: 30 TAB | Refills: 0 | Status: SHIPPED | OUTPATIENT
Start: 2020-07-10 | End: 2020-07-20

## 2020-07-10 RX ORDER — IBUPROFEN 200 MG
1 TABLET ORAL DAILY
Qty: 30 PATCH | Refills: 0 | Status: SHIPPED | OUTPATIENT
Start: 2020-07-11 | End: 2020-08-10

## 2020-07-10 RX ORDER — FLUOXETINE HYDROCHLORIDE 20 MG/1
20 CAPSULE ORAL DAILY
Qty: 30 CAP | Refills: 0 | Status: SHIPPED | OUTPATIENT
Start: 2020-07-11

## 2020-07-10 RX ADMIN — FLUOXETINE 20 MG: 20 CAPSULE ORAL at 08:51

## 2020-07-10 NOTE — PROGRESS NOTES
Problem: Discharge Planning  Goal: *Discharge to safe environment  Description: Patient identifies home as a safe environment. Patient will return home upon discharge. Outcome: Progressing Towards Goal  Goal: *Knowledge of medication management  Description: Patient verbalizes understanding of medication regimen. Patient agrees to take medications as prescribed. Outcome: Progressing Towards Goal  Goal: *Knowledge of discharge instructions  Description: Patient verbalizes understanding of goals for treatment and safe discharge.   Outcome: Progressing Towards Goal

## 2020-07-10 NOTE — DISCHARGE INSTRUCTIONS
Patient Education        Learning About Depression Screening  What is depression screening? Depression screening is a way to see if you have depression symptoms. It may be done by a doctor or counselor. This screening is often part of a routine checkup. That's because your mental health is just as important as your physical health. Depression is a medical illness that affects how you feel, think, and act. You may:  · Have less energy. · Lose interest in your daily activities. · Feel sad and grouchy for a long time. Depression is very common. It affects men and women of all ages. Many things can trigger depression. Some people become depressed after they have a stroke or find out they have a major illness like cancer or heart disease. The death of a loved one, a breakup, or changes in the natural brain chemicals may lead to depression. It can run in families. Most experts believe that a combination of family history (a person's genes) and stressful life events can cause depression. What happens during screening? Your doctor may ask about your feelings, any changes in eating habits, your energy level, and your interest in your daily tasks. He or she may ask other questions, such as how well you are sleeping and if you can focus on the things you do. This may be an informal talk between the two of you. Or your doctor may ask you to fill out a quick form and then talk about your answers. Some diseases or changes in your body can cause symptoms that look like depression. So your doctor may do blood tests to help rule out other problems, such as hormone changes, a low thyroid level, or anemia. What happens after screening? If you have signs of depression, your doctor will talk to you about your options. Doctors usually treat depression with medicines or counseling. Often, combining the two works best. Many people don't get help because they think that they'll get over the depression on their own.  But people with depression may not get better unless they get treatment. Many people feel embarrassed or ashamed about having depression. But it isn't a sign of personal weakness. It's not a character flaw. A person who is depressed is not \"crazy. \" Depression is caused by changes in the brain. A serious symptom of depression is thinking about death or suicide. If you or someone you care about talks about this or about feeling hopeless, get help right away. It's important to know that depression can be treated. The first step toward feeling better is often just seeing that the problem exists. Where can you learn more? Go to http://mar[employer].info/  Enter T185 in the search box to learn more about \"Learning About Depression Screening. \"  Current as of: 2020               Content Version: 12.5  © 4499-2608 Health[employer], [employer]. Care instructions adapted under license by [employer] (which disclaims liability or warranty for this information). If you have questions about a medical condition or this instruction, always ask your healthcare professional. [place] [address] any warranty or liability for your use of this information. If I feel I am at risk of hurting myself or others, I will call the crisis office and speak with a crisis worker who will assist me during my crisis. [address]  [phone]  [address] [place] [address] [phone]  [facility]  [address] [place]  [phone]         DISCHARGE SUMMARY    Anushka Rodriguez  : 1995  MRN: [id number]    The patient Maximino Mireles exhibits the ability to control behavior in a less restrictive environment. Patient's level of functioning is improving. No assaultive/destructive behavior has been observed for the past 24 hours.   No suicidal/homicidal threat or behavior has been observed for the past 24 hours. There is no evidence of serious medication side effects. Patient has not been in physical or protective restraints for at least the past 24 hours. If weapons involved, how are they secured? No weapons involved. Is patient aware of and in agreement with discharge plan? Yes    Arrangements for medication:  Prescriptions sent to [facility] on [facility] AT [facility]. Copy of discharge instructions to provider?:  [address] for transportation home:  Mother to . Keep all follow up appointments as scheduled, continue to take prescribed medications per physician instructions. Mental health crisis number:  794 or your local mental health crisis line number at [phone].

## 2020-07-10 NOTE — PROGRESS NOTES
Pharmacist Discharge Medication Reconciliation    Discharging Provider: Lianna Tony NP    Significant PMH:   Past Medical History:   Diagnosis Date    Anxiety     Depression     GERD (gastroesophageal reflux disease)      Chief Complaint for this Admission: No chief complaint on file. Allergies: Patient has no known allergies. Discharge Medications:   Current Discharge Medication List        START taking these medications    Details   FLUoxetine (PROzac) 20 mg capsule Take 1 Cap by mouth daily. Indications: major depressive disorder, Depression  Qty: 30 Cap, Refills: 0      hydrOXYzine HCL (ATARAX) 50 mg tablet Take 1 Tab by mouth three (3) times daily as needed for Anxiety for up to 10 days. Indications: anxious  Qty: 30 Tab, Refills: 0      nicotine (NICODERM CQ) 21 mg/24 hr 1 Patch by TransDERmal route daily for 30 days. Indications: stop smoking  Qty: 30 Patch, Refills: 0      traZODone (DESYREL) 50 mg tablet Take 1 Tab by mouth nightly as needed for Sleep (For insomnia).  Indications: insomnia associated with depression  Qty: 30 Tab, Refills: 0             The patient's chart, MAR and AVS were reviewed by Idania Koch, PHARMD, BCPS

## 2020-07-10 NOTE — H&P
Psychiatry History and Physical    Subjective:     Date of Evaluation:  7/9/2020    History of Presenting Problem: Jayden Streeter is a 30year old male admitted to the [facility] for depression and suicidal ideation. He has been feeling lonely and alone and having thoughts that he does not want to be here. He is currently unemployed and feeling badly about himself. He found medication somewhat helpful after an admission at [facility] in 2013. He denies current SI/HI/AH/VH. He has been using heroin for the past month. He feels his symptoms have increased in the past couple of months. Patient Active Problem List    Diagnosis Date Noted    Major depressive disorder 07/09/2020     Past Medical History:   Diagnosis Date    Anxiety     Depression     GERD (gastroesophageal reflux disease)      No past surgical history on file. Family History   Problem Relation Age of Onset   [facility] Depression Mother     Hypertension Mother     Stroke Maternal Uncle     Diabetes Maternal Uncle     Diabetes Cousin       Social History     Tobacco Use    Smoking status: Current Every Day Smoker     Packs/day: 1.00     Years: 7.00     Pack years: 7.00     Types: Cigarettes     Start date: 5/11/2013    Smokeless tobacco: Never Used   Substance Use Topics    Alcohol use: Not Currently     Prior to Admission medications    Not on File     No Known Allergies       Objective:     No data found.     Mental Status exam: WNL except for    Sensorium  oriented to time, place and person   Orientation person, place, time/date and situation   Relations cooperative   Eye Contact poor   Appearance:  age appropriate   Motor Behavior:  within normal limits   Speech:  normal pitch and normal volume   Vocabulary average   Thought Process: logical   Thought Content not internally preoccupied    Suicidal ideations none   Homicidal ideations none   Mood:  depressed   Affect:  depressed   Memory recent  adequate   Memory remote:  adequate   Concentration:  adequate Abstraction:  abstract   Insight:  fair   Reliability fair   Judgment:  fair         Impression:      Active Problems:    Major depressive disorder (7/9/2020)          Plan:     Admit to [employer] further information  Start prozac 20mg daily  Disposition planning with social work  Estimated length of stay 3-5 days

## 2020-07-10 NOTE — INTERDISCIPLINARY ROUNDS
Behavioral Health Interdisciplinary Rounds Patient Name: Carmen Buenrostro  Age: 30 y.o. Room/Bed:  [facility]/01 Primary Diagnosis: <principal problem not specified> Admission Status: Voluntary Readmission within 30 days: no 
Power of  in place: no 
Patient requires a blocked bed: yes Reason for blocked bed: covid Order for blocked bed obtained: no    
 
Sleep hours: 8 Morning Labs completed per orders:  no      
Participation in Care/Groups:  yes Medication Compliant?: Yes PRNS (last 24 hours): Antianxiety and Sleep Aid Restraints (last 24 hours):  no 
Substance Abuse:  no   
24 hour chart check complete: yes

## 2020-07-10 NOTE — BH NOTES
GROUP THERAPY PROGRESS NOTE    Patient did not participate in Discharge Group.      Kecia Peck [facility]

## 2020-07-10 NOTE — BH NOTES
PSYCHOSOCIAL ASSESSMENT  :Patient identifying info:  Mallory Weber is a 30 y.o., male admitted 7/8/2020  6:59 PM     Presenting problem and precipitating factors: Patient was transferred to [address] [place] [address] from [facility] ED after arriving c/o SI and worsening depression. Pt endorses weight loss, insomnia, feelings of hopelessness, worthlessness, and helplessness. He reports these symptoms are worsened by his current unemployment. He reports he sought help to get back on antidepressant medication, which has been helpful in the past.    Mental status assessment: Alert, oriented, calm cooperative    Strengths: Supportive family; seeks help; motivated for treatment    Collateral information: Bob Weems (mother [phone])    Current psychiatric /substance abuse providers and contact info: To be linked    Previous psychiatric/substance abuse providers and response to treatment: 1 previous inpatient psychiatric hospitalization ([facility])    Family history of mental illness or substance abuse: None indicated    Substance abuse history:  THC; Opiates (heroin and percocet)  Social History     Tobacco Use    Smoking status: Current Every Day Smoker     Packs/day: 1.00     Years: 7.00     Pack years: 7.00     Types: Cigarettes     Start date: 5/11/2013    Smokeless tobacco: Never Used   Substance Use Topics    Alcohol use: Not Currently       History of biomedical complications associated with substance abuse : Diarrhea, chills, sleep disturbance, skin crawling    Patient's current acceptance of treatment or motivation for change: Good    Family constellation: 3child (3year old); mother    Is significant other involved? No      Describe support system: Mother    Describe living arrangements and home environment: Patient lives alone.     Health issues:   Hospital Problems  Never Reviewed          Codes Class Noted POA    Major depressive disorder ICD-10-CM: F32.9  ICD-9-CM: 296.20  7/9/2020 Unknown              Trauma history: 3 year incarceration    Legal issues: None current; previous incarceration    History of  service: No    Financial status: Unemployement    Sikhism/cultural factors: None    Education/work history: Currently unemployed    Have you been licensed as a health care professional (current or ): No    Leisure and recreation preferences: Unknown    Describe coping skills: Limited    Brittny Herman  2020

## 2020-07-10 NOTE — PROGRESS NOTES
Problem: Falls - Risk of  Goal: *Absence of Falls  Description: Document Todd Chun Fall Risk and appropriate interventions in the flowsheet.   Outcome: Progressing Towards Goal  Note: Fall Risk Interventions:

## 2020-07-10 NOTE — BH NOTES
Patient was seen in the hallway approached this RN with a concern about sleeping tonight which he stated that he did not sleep well last night and inquired if sleep medication was available.   2212 PRN trazodone 50 mg po was given for sleep and 50 mg po attarax was given for anxiety

## 2020-07-10 NOTE — BH NOTES
GROUP THERAPY PROGRESS NOTE    Patient is participating in Specialty group. Group time: 50 minutes    Personal goal for participation: To discussion communication issues/problems and ways to communicate more clearly. Goal orientation: Personal    Group therapy participation: active    Therapeutic interventions reviewed and discussed: Group discussion was focused issues that arise due to communication issues. Patients shared common issues including double messages, assumptions, hinting, unable to admit mistakes, and defensive responses. Patient discussed ways to communicate more clearly through the use of I statements. Impression of participation: Emilie De La Cruz shared that he and his girlfriend struggle with communication at times. He asked for an extra handout because he would like to share the information with his girlfriend to see if this could benefit them in communicating. He reports he tends to struggle with assuming what she means instead of asking for clarification. He also reports the use of \"I statements\" could help him better communicate his feelings to her.     Caridad Marie LPC LSATP CSAC

## 2020-07-11 NOTE — BH NOTES
Behavioral Health Transition Record to Provider    Patient Name: Tressa Ahuja  YOB: 1995  Medical Record Number: [id number]  Date of Admission: 7/8/2020  Date of Discharge: 7/10/2020    Attending Provider: Trinity Mckeon NP  Discharging Provider: Trinity Mckeon NP  To contact this individual call [phone] and ask the  to page. If unavailable, ask to be transferred to [facility] Provider on call. [facility] Provider will be available on call 24/7 and during holidays. Primary Care Provider: Sandra Flor PA-C    No Known Allergies    Reason for Admission: Tressa Ahuja is a 30year old male admitted to the [facility] for depression and suicidal ideation. He has been feeling lonely and alone and having thoughts that he does not want to be here. He is currently unemployed and feeling badly about himself. He found medication somewhat helpful after an admission at [facility] in 2013. He denies current SI/HI/AH/VH. He has been using heroin for the past month. He feels his symptoms have increased in the past couple of months. Admission Diagnosis: Major depressive disorder [F32.9]    * No surgery found *    Results for orders placed or performed during the hospital encounter of 07/08/20   URINE CULTURE HOLD SAMPLE    Specimen: Serum; Urine   Result Value Ref Range    Urine culture hold        Urine on hold in Microbiology dept for 2 days. If unpreserved urine is submitted, it cannot be used for addtional testing after 24 hours, recollection will be required.    CBC WITH AUTOMATED DIFF   Result Value Ref Range    WBC 4.9 4.1 - 11.1 K/uL    RBC 5.21 4.10 - 5.70 M/uL    HGB 15.0 12.1 - 17.0 g/dL    HCT 46.4 36.6 - 50.3 %    MCV 89.1 80.0 - 99.0 FL    MCH 28.8 26.0 - 34.0 PG    MCHC 32.3 30.0 - 36.5 g/dL    RDW 13.3 11.5 - 14.5 %    PLATELET 234 500 - 053 K/uL    MPV 11.5 8.9 - 12.9 FL    NRBC 0.0 0  WBC    ABSOLUTE NRBC 0.00 0.00 - 0.01 K/uL    NEUTROPHILS 69 32 - 75 % LYMPHOCYTES 24 12 - 49 %    MONOCYTES 7 5 - 13 %    EOSINOPHILS 0 0 - 7 %    BASOPHILS 0 0 - 1 %    IMMATURE GRANULOCYTES 0 0.0 - 0.5 %    ABS. NEUTROPHILS 3.4 1.8 - 8.0 K/UL    ABS. LYMPHOCYTES 1.2 0.8 - 3.5 K/UL    ABS. MONOCYTES 0.3 0.0 - 1.0 K/UL    ABS. EOSINOPHILS 0.0 0.0 - 0.4 K/UL    ABS. BASOPHILS 0.0 0.0 - 0.1 K/UL    ABS. IMM. GRANS. 0.0 0.00 - 0.04 K/UL    DF AUTOMATED     METABOLIC PANEL, COMPREHENSIVE   Result Value Ref Range    Sodium 136 136 - 145 mmol/L    Potassium HEMOLYZED,RECOLLECT REQUESTED 3.5 - 5.1 mmol/L    Chloride 107 97 - 108 mmol/L    CO2 23 21 - 32 mmol/L    Anion gap 6 5 - 15 mmol/L    Glucose 95 65 - 100 mg/dL    BUN 15 6 - 20 MG/DL    Creatinine 1.03 0.70 - 1.30 MG/DL    BUN/Creatinine ratio 15 12 - 20      GFR est AA >60 >60 ml/min/1.73m2    GFR est non-AA >60 >60 ml/min/1.73m2    Calcium 9.1 8.5 - 10.1 MG/DL    Bilirubin, total 0.5 0.2 - 1.0 MG/DL    ALT (SGPT) 37 12 - 78 U/L    AST (SGOT) HEMOLYZED,RECOLLECT REQUESTED 15 - 37 U/L    Alk.  phosphatase 80 45 - 117 U/L    Protein, total 8.1 6.4 - 8.2 g/dL    Albumin 4.3 3.5 - 5.0 g/dL    Globulin 3.8 2.0 - 4.0 g/dL    A-G Ratio 1.1 1.1 - 2.2     ACETAMINOPHEN   Result Value Ref Range    Acetaminophen level <2 (L) 10 - 30 ug/mL   SALICYLATE   Result Value Ref Range    Salicylate level 3.1 2.8 - 20.0 MG/DL   ETHYL ALCOHOL   Result Value Ref Range    ALCOHOL(ETHYL),SERUM <10 <10 MG/DL   DRUG SCREEN, URINE   Result Value Ref Range    AMPHETAMINES Negative NEG      BARBITURATES Negative NEG      BENZODIAZEPINES Negative NEG      COCAINE Negative NEG      METHADONE Negative NEG      OPIATES Negative NEG      PCP(PHENCYCLIDINE) Negative NEG      THC (TH-CANNABINOL) Positive (A) NEG      Drug screen comment (NOTE)    URINALYSIS W/MICROSCOPIC   Result Value Ref Range    Color YELLOW/STRAW      Appearance CLEAR CLEAR      Specific gravity 1.007 1.003 - 1.030      pH (UA) 6.0 5.0 - 8.0      Protein Negative NEG mg/dL    Glucose Negative NEG mg/dL    Ketone Negative NEG mg/dL    Bilirubin Negative NEG      Blood Negative NEG      Urobilinogen 0.2 0.2 - 1.0 EU/dL    Nitrites Negative NEG      Leukocyte Esterase Negative NEG      WBC 0-4 0 - 4 /hpf    RBC 0-5 0 - 5 /hpf    Epithelial cells FEW FEW /lpf    Bacteria Negative NEG /hpf    Hyaline cast 0-2 0 - 5 /lpf       Immunizations administered during this encounter: There is no immunization history on file for this patient. Screening for Metabolic Disorders for Patients on Antipsychotic Medications  (Data obtained from the EMR)    Estimated Body Mass Index  Estimated body mass index is 32.78 kg/m² as calculated from the following:    Height as of this encounter: 5' 11\" (1.803 m). Weight as of 20: 106.6 kg (235 lb). Vital Signs/Blood Pressure  Visit Vitals  /87 (BP 1 Location: Left arm, BP Patient Position: At rest)   Pulse 72   Temp 97.8 °F (36.6 °C)   Resp 18   Ht 5' 11\" (1.803 m)   SpO2 98%   BMI 32.78 kg/m²       Blood Glucose/Hemoglobin A1c  Lab Results   Component Value Date/Time    Glucose 95 2020 01:31 PM       No results found for: HBA1C, HGBE8, IEY5JVNP     Lipid Panel  No results found for: CHOL, CHOLX, CHLST, CHOLV, 042186, HDL, HDLP, LDL, LDLC, DLDLP, TGLX, TRIGL, TRIGP, CHHD, CHHDX     Discharge Diagnosis: Major depressive disorder (ICD-10-CM: F32.9)    Discharge Plan: Patient discharged home into the care of family. DISCHARGE SUMMARY    Ramin Persaud  : 1995  MRN: [id number]    The patient Jamie Ervin exhibits the ability to control behavior in a less restrictive environment. Patient's level of functioning is improving. No assaultive/destructive behavior has been observed for the past 24 hours. No suicidal/homicidal threat or behavior has been observed for the past 24 hours. There is no evidence of serious medication side effects. Patient has not been in physical or protective restraints for at least the past 24 hours.     If weapons involved, how are they secured? No weapons involved. Is patient aware of and in agreement with discharge plan? Yes    Arrangements for medication:  Prescriptions sent to [facility] on [facility]. Copy of discharge instructions to provider?:  [address] for transportation home:  Mother to . Keep all follow up appointments as scheduled, continue to take prescribed medications per physician instructions. Mental health crisis number:  050 or your local mental health crisis line number at [phone]. Discharge Medication List and Instructions:   Discharge Medication List as of 7/10/2020  2:15 PM      START taking these medications    Details   FLUoxetine (PROzac) 20 mg capsule Take 1 Cap by mouth daily. Indications: major depressive disorder, Depression, Normal, Disp-30 Cap,R-0      hydrOXYzine HCL (ATARAX) 50 mg tablet Take 1 Tab by mouth three (3) times daily as needed for Anxiety for up to 10 days. Indications: anxious, Normal, Disp-30 Tab,R-0      nicotine (NICODERM CQ) 21 mg/24 hr 1 Patch by TransDERmal route daily for 30 days. Indications: stop smoking, Normal, Disp-30 Patch,R-0      traZODone (DESYREL) 50 mg tablet Take 1 Tab by mouth nightly as needed for Sleep (For insomnia). Indications: insomnia associated with depression, Normal, Disp-30 Tab,R-0             Unresulted Labs (24h ago, onward)    None        To obtain results of studies pending at discharge, please contact [phone]    Follow-up Information     Follow up With Specialties Details Why Contact Info    Justin Metzger PA-C Physician Assistant   [address] [address]  On 7/22/2020 You have a 9:00am tele-health appointment for individual therapy. You will receive an email with instructions for this appointment.  [facility]  [address], [address] [facility], [address] [place] [address] Ave  [phone]          Advanced Directive:   Does the patient have an appointed surrogate decision maker? No  Does the patient have a Medical Advance Directive? No  Does the patient have a Psychiatric Advance Directive? No  If the patient does not have a surrogate or Medical Advance Directive AND Psychiatric Advance Directive, the patient was offered information on these advance directives Yes and Patient declined to complete    Patient Instructions: Please continue all medications until otherwise directed by physician. Tobacco Cessation Discharge Plan:   Is the patient a smoker and needs referral for smoking cessation? Yes  Patient referred to the following for smoking cessation with an appointment? Refused     Patient was offered medication to assist with smoking cessation at discharge? Refused  Was education for smoking cessation added to the discharge instructions? Yes    Alcohol/Substance Abuse Discharge Plan:   Does the patient have a history of substance/alcohol abuse and requires a referral for treatment? Yes  Patient referred to the following for substance/alcohol abuse treatment with an appointment? Yes, Patient has an appointment with [address] [place] [address] on 7/22/2020 at 9:00am.  Patient was offered medication to assist with alcohol cessation at discharge? Refused  Was education for substance/alcohol abuse added to discharge instructions? Yes    Patient discharged to Home; discussed with patient/caregiver and provided to the patient/caregiver either in hard copy or electronically.

## 2020-07-14 NOTE — DISCHARGE SUMMARY
DISCHARGE SUMMARY    Some parts of the discharge summary are from the initial Psychiatric interview that was done on admission by the admitting psychiatrist.      Date of Admission: 7/8/2020    Date of Discharge:7/13/2020     TYPE OF DISCHARGE:   REGULAR     History of Presenting Problem: Sarah Barone is a 30year old male admitted to the [facility] for depression and suicidal ideation. He has been feeling lonely and alone and having thoughts that he does not want to be here. He is currently unemployed and feeling badly about himself. He found medication somewhat helpful after an admission at [facility] in 2013. He denies current SI/HI/AH/VH. He has been using heroin for the past month. He feels his symptoms have increased in the past couple of months. Course in the Hospital:     Patient was admitted to the inpatient psychiatry unit for acute psychiatric stabilization in regards to symptomatology as described in the HPI above and placed on Q15 minute checks and withdrawal precautions. While on the unit Gregg Felder was involved in individual, group, occupational and milieu therapy. He was started back on his usual medication regimen as well as PRN medications including trazodone . He improved gradually and was able to integrate into the milieu with help from the nursing staff. Patients symptoms improved gradually including suicidal ideation   . He was quite on the unit, appropriate in his interactions, and cooperative with medications and the unit routine. Please see individual progress notes for more specific details regarding patient's hospitalization course. Patient was discharged as per the plan. He had been doing well on the unit as per the report of the nursing staff and my observations. No PRN medication for agitation, seclusion or restraints were required during the last 48 hours of her stay. Sarah Barone had improved progressively to the point of being stable for discharge and outpatient FU.  At this time he did not offer any complaints. Patient denied any SI or HI. Denied any AH or VH. He denied any delusions. Was not considered a danger to self or to others and is safe for discharge. Will FU with his appointments and remains motivated to be in treatment. The patient verbalized understanding of his discharge instructions. DISCHARGE DIAGNOSIS:    Major Depressive Disorder, recurrent, severe    Discharge Medication List as of 7/10/2020  2:15 PM      START taking these medications    Details   FLUoxetine (PROzac) 20 mg capsule Take 1 Cap by mouth daily. Indications: major depressive disorder, Depression, Normal, Disp-30 Cap,R-0      hydrOXYzine HCL (ATARAX) 50 mg tablet Take 1 Tab by mouth three (3) times daily as needed for Anxiety for up to 10 days. Indications: anxious, Normal, Disp-30 Tab,R-0      nicotine (NICODERM CQ) 21 mg/24 hr 1 Patch by TransDERmal route daily for 30 days. Indications: stop smoking, Normal, Disp-30 Patch,R-0      traZODone (DESYREL) 50 mg tablet Take 1 Tab by mouth nightly as needed for Sleep (For insomnia). Indications: insomnia associated with depression, Normal, Disp-30 Tab,R-0              Follow-up Information     Follow up With Specialties Details Why Contact Info    Jesús Metzger PA-C Physician Assistant   [address] [facility]  [address]  [phone]      [facility]  On 7/22/2020 You have a 9:00am tele-health appointment for individual therapy. You will receive an email with instructions for this appointment. [facility]  [address], [address], [address]  [phone]        WOUND CARE: none needed. PROGNOSIS:   Good based on nature of patient's pathology/ies and treatment compliance issues. Prognosis is greatly dependent upon patient's ability to  follow up on psychiatric/psychotherapy appointments as well as to comply with psychiatric medications as prescribed. Yovany Honeycutt

## 2022-05-17 ENCOUNTER — APPOINTMENT (OUTPATIENT)
Dept: GENERAL RADIOLOGY | Age: 27
End: 2022-05-17
Attending: EMERGENCY MEDICINE
Payer: MEDICAID

## 2022-05-17 ENCOUNTER — HOSPITAL ENCOUNTER (EMERGENCY)
Age: 27
Discharge: HOME | End: 2022-05-17
Attending: EMERGENCY MEDICINE
Payer: MEDICAID

## 2022-05-17 VITALS
OXYGEN SATURATION: 99 % | SYSTOLIC BLOOD PRESSURE: 137 MMHG | WEIGHT: 220 LBS | BODY MASS INDEX: 30.8 KG/M2 | DIASTOLIC BLOOD PRESSURE: 84 MMHG | TEMPERATURE: 98 F | RESPIRATION RATE: 18 BRPM | HEART RATE: 77 BPM | HEIGHT: 71 IN

## 2022-05-17 DIAGNOSIS — S61.309A NAIL AVULSION, FINGER, INITIAL ENCOUNTER: ICD-10-CM

## 2022-05-17 DIAGNOSIS — S62.669B: Primary | ICD-10-CM

## 2022-05-17 PROCEDURE — 74011000250 HC RX REV CODE- 250: Performed by: EMERGENCY MEDICINE

## 2022-05-17 PROCEDURE — 74011250637 HC RX REV CODE- 250/637: Performed by: NURSE PRACTITIONER

## 2022-05-17 PROCEDURE — 96375 TX/PRO/DX INJ NEW DRUG ADDON: CPT

## 2022-05-17 PROCEDURE — 74011250636 HC RX REV CODE- 250/636: Performed by: NURSE PRACTITIONER

## 2022-05-17 PROCEDURE — 99284 EMERGENCY DEPT VISIT MOD MDM: CPT

## 2022-05-17 PROCEDURE — 96374 THER/PROPH/DIAG INJ IV PUSH: CPT

## 2022-05-17 PROCEDURE — 73130 X-RAY EXAM OF HAND: CPT

## 2022-05-17 PROCEDURE — 74011250636 HC RX REV CODE- 250/636: Performed by: EMERGENCY MEDICINE

## 2022-05-17 PROCEDURE — 36415 COLL VENOUS BLD VENIPUNCTURE: CPT

## 2022-05-17 RX ORDER — MORPHINE SULFATE 4 MG/ML
4 INJECTION INTRAVENOUS ONCE
Status: COMPLETED | OUTPATIENT
Start: 2022-05-17 | End: 2022-05-17

## 2022-05-17 RX ORDER — OXYCODONE AND ACETAMINOPHEN 5; 325 MG/1; MG/1
1 TABLET ORAL
Status: COMPLETED | OUTPATIENT
Start: 2022-05-17 | End: 2022-05-17

## 2022-05-17 RX ADMIN — OXYCODONE AND ACETAMINOPHEN 1 TABLET: 5; 325 TABLET ORAL at 17:13

## 2022-05-17 RX ADMIN — MORPHINE SULFATE 4 MG: 4 INJECTION, SOLUTION INTRAMUSCULAR; INTRAVENOUS at 14:00

## 2022-05-17 RX ADMIN — CEFAZOLIN 2 G: 1 INJECTION, POWDER, FOR SOLUTION INTRAMUSCULAR; INTRAVENOUS at 14:25

## 2022-05-17 NOTE — CONSULTS
ORTHOPEDIC CONSULT    Patient: Raudel Macedo MRN: [id number]  SSN: [id number]    YOB: 1995  Age: 32 y.o. Sex: male      Subjective:      Raudel Macedo is a 32 y.o. male who is being seen in orthopedic consultation in the emergency dept. For crush injury to his left hand. The patient is resident of prison facility, he states he got his hand caught in steel door. Patient is left hand dominate. Patient has been anesthetised with a digital block. Initial he states he had severe pain of his 3rd and 4th digit. He denies any wrist pain or any other pain of his left upper extremity. He denies any other musculoskeletal complaints at this time. Past Medical History:   Diagnosis Date    Anxiety     Depression     GERD (gastroesophageal reflux disease)      No past surgical history on file. Family History   Problem Relation Age of Onset   Lenz Depression Mother     Hypertension Mother     Stroke Maternal Uncle     Diabetes Maternal Uncle     Diabetes Cousin      Social History     Tobacco Use    Smoking status: Current Every Day Smoker     Packs/day: 1.00     Years: 7.00     Pack years: 7.00     Types: Cigarettes     Start date: 5/11/2013    Smokeless tobacco: Never Used   Substance Use Topics    Alcohol use: Not Currently      Prior to Admission medications    Medication Sig Start Date End Date Taking? Authorizing Provider   FLUoxetine (PROzac) 20 mg capsule Take 1 Cap by mouth daily. Indications: major depressive disorder, Depression 7/11/20   Nima Miners A, NP   traZODone (DESYREL) 50 mg tablet Take 1 Tab by mouth nightly as needed for Sleep (For insomnia). Indications: insomnia associated with depression 7/10/20   Nima Miners A, NP   ibuprofen (MOTRIN) 800 mg tablet Take 1 Tab by mouth every eight (8) hours as needed for Pain. 2/27/18   VIDA Hansen       No Known Allergies    Review of Systems:  Review of Systems   Constitutional: Negative. HENT: Negative. Eyes: Negative. Cardiovascular: Negative. Gastrointestinal: Negative. Genitourinary: Negative. Musculoskeletal: Positive for joint pain. Left hand   Skin: Negative. Neurological: Negative. Endo/Heme/Allergies: Negative. Psychiatric/Behavioral: Positive for depression. Objective:     Current Facility-Administered Medications   Medication Dose Route Frequency    morphine injection 4 mg  4 mg IntraVENous ONCE     Current Outpatient Medications   Medication Sig    FLUoxetine (PROzac) 20 mg capsule Take 1 Cap by mouth daily. Indications: major depressive disorder, Depression    traZODone (DESYREL) 50 mg tablet Take 1 Tab by mouth nightly as needed for Sleep (For insomnia). Indications: insomnia associated with depression    ibuprofen (MOTRIN) 800 mg tablet Take 1 Tab by mouth every eight (8) hours as needed for Pain. Vitals:    05/17/22 1309 05/17/22 1310   BP:  137/84   Pulse:  77   Resp:  18   Temp:  98 °F (36.7 °C)   SpO2:  99%   Weight: 99.8 kg (220 lb)    Height: 5' 11\" (1.803 m)         Alert and oriented x3, No apparent distress    Physical Exam:  Left hand: Avulsion of the nail beds of digits 3 and 4. Volar aspect. Mild bleeding from sites. No bone exposure. Sensation to sharp dull touch decreased, patient received digital block. Cap. Refill less than 2 secs. Limited ROM to DIP 3 and 4th digit secondary to pain. No crepitus. Good ROM of remaining fingers without discomfort. Radial pulse +2. Good ROM elbow shoulder without tenderness. Right upper extremity neurovascular intact. Labs:  CBC:No results for input(s): WBC, RBC, HGB, HCT, MCV, RDW, PLT, HGBEXT, HCTEXT, PLTEXT in the last 72 hours. CHEMISTRIES:No results for input(s): NA, K, CL, CO2, BUN, CREA, CA, PHOS, MG in the last 72 hours. No lab exists for component: GLUCOSEPT/INR:No results for input(s): INR, INREXT in the last 72 hours. No lab exists for component: PROTIME  APTT:No results for input(s):  APTT in the last 72 hours.  LIVER PROFILE:No results for input(s): AST, ALT in the last 72 hours. No lab exists for component: BILIDIR, BILITOT, ALKPHOS    IMAGING:  X-rays taken of his left hand shows small comminuted tuft fractures of the third and fourth distal phalanges. No other acute fracture seen. Assessment/Plan:     Avulsion/crush injury to left hand  Tuft fractures third and fourth distal phalanx left hand  Discussed with ED staff. No acute orthopedic surgical intervention needed at this time. Recommend thorough irrigation of the fingers. I explained to the patient that he will eventually lose the nails that he can be removed at bedside here if needed. Bulky dressing to third and fourth digits. Tetanus shot has been given by ED staff. Recommend oral antibiotics upon discharge. Patient can follow-up with orthopedics as an outpatient within 1 week. This patient was examined direct consult with Dr. Yanely Snyder. Thank you for the courtesy of this consult.     Signed By: Oleg Mike PA-C     May 17, 2022

## 2022-05-17 NOTE — ED NOTES
Non adherent dressing applied to left third and 4th finger  with wooden splint.  Bulky dressing applied as well

## 2022-05-17 NOTE — ED TRIAGE NOTES
Left middle and ring finger crushed in door at care home. Both nails have finger nail avulsion, possible open fracture.

## 2022-05-19 NOTE — ED PROVIDER NOTES
EMERGENCY DEPARTMENT HISTORY AND PHYSICAL EXAM      Date: 5/17/2022  Patient Name: Mady Cortez    History of Presenting Illness     Chief Complaint   Patient presents with    Crush injury       History Provided By: Patient and EMS    HPI: Mady Cortez, 32 y.o. male with a past medical history significant anxiety, depression, GERD presents to the ED with cc of injury to left middle and ring finger after being closed in prison cell door occurring just prior to arrival. Digital block done by prison medical provider PTA. Obvious nail avulsion, controlled bleeding to fingertips and disrupted skin. Swelling to fingers and base of fingers where digital block was performed but no other deformity or injury noted. Patient denies any other injury. Accidental in nature. He is able to move his fingers although painful and capillary refill on area is able to be palpated is appropriate and less than 2 seconds. Reports his tetanus shot is up-to-date. There are no other complaints, changes, or physical findings at this time. PCP: Jahaira CH PA-C    No current facility-administered medications on file prior to encounter. Current Outpatient Medications on File Prior to Encounter   Medication Sig Dispense Refill    FLUoxetine (PROzac) 20 mg capsule Take 1 Cap by mouth daily. Indications: major depressive disorder, Depression 30 Cap 0    traZODone (DESYREL) 50 mg tablet Take 1 Tab by mouth nightly as needed for Sleep (For insomnia). Indications: insomnia associated with depression 30 Tab 0    ibuprofen (MOTRIN) 800 mg tablet Take 1 Tab by mouth every eight (8) hours as needed for Pain. 30 Tab 0       Past History     Past Medical History:  Past Medical History:   Diagnosis Date    Anxiety     Depression     GERD (gastroesophageal reflux disease)        Past Surgical History:  No past surgical history on file.     Family History:  Family History   Problem Relation Age of Onset    Depression Mother    Lynn Reid Hypertension Mother     Stroke Maternal Uncle     Diabetes Maternal Uncle     Diabetes Cousin        Social History:  Social History     Tobacco Use    Smoking status: Current Every Day Smoker     Packs/day: 1.00     Years: 7.00     Pack years: 7.00     Types: Cigarettes     Start date: 5/11/2013    Smokeless tobacco: Never Used   Substance Use Topics    Alcohol use: Not Currently    Drug use: Yes     Frequency: 1.0 times per week     Types: Marijuana, Heroin       Allergies:  No Known Allergies      Review of Systems     Review of Systems   Constitutional: Negative. HENT: Negative. Eyes: Negative. Respiratory: Negative. Cardiovascular: Negative. Gastrointestinal: Negative. Genitourinary: Negative. Musculoskeletal: Positive for arthralgias and joint swelling. Skin: Positive for wound. Neurological: Negative. Numbness from digital block but has sensation   Psychiatric/Behavioral: Negative. All other systems reviewed and are negative. Physical Exam     Physical Exam  Vitals and nursing note reviewed. Constitutional:       General: He is not in acute distress. Appearance: Normal appearance. He is normal weight. He is not ill-appearing, toxic-appearing or diaphoretic. HENT:      Head: Normocephalic. Nose: Nose normal.      Mouth/Throat:      Pharynx: Oropharynx is clear. Eyes:      Conjunctiva/sclera: Conjunctivae normal.   Cardiovascular:      Rate and Rhythm: Normal rate. Pulses: Normal pulses. Pulmonary:      Effort: Pulmonary effort is normal. No respiratory distress. Abdominal:      Tenderness: There is no abdominal tenderness. Musculoskeletal:         General: Swelling, tenderness, deformity and signs of injury present. Hands:       Cervical back: Normal range of motion. Skin:     General: Skin is warm and dry. Capillary Refill: Capillary refill takes less than 2 seconds.       Findings: Signs of injury, laceration and wound present. Neurological:      General: No focal deficit present. Mental Status: He is alert and oriented to person, place, and time. Sensory: Sensation is intact. Motor: Motor function is intact. Lab and Diagnostic Study Results     Labs -   No results found for this or any previous visit (from the past 12 hour(s)). Radiologic Studies -   @lastxrresult@  XR HAND LT MIN 3 V   Final Result            Medical Decision Making   - I am the first provider for this patient. - I reviewed the vital signs, available nursing notes, past medical history, past surgical history, family history and social history. - Initial assessment performed. The patients presenting problems have been discussed, and they are in agreement with the care plan formulated and outlined with them. I have encouraged them to ask questions as they arise throughout their visit. Vital Signs-Reviewed the patient's vital signs. No data found. Records Reviewed: Nursing Notes and Old Medical Records    The patient presents with injury to left hand with a differential diagnosis of fracture, open fracture, dislocation, partial amputation, avulsion, laceration      ED Course:     ED Course as of 05/18/22 2225 Tue May 17, 212    32year old male presents with hand injury specifically to left middle and fourth fingers. Left hand dominant. Digital block PTA initially controlled pain but is returning. No other areas of injury. No circumferential swelling, pain out of proportion, pallor, or paresthesia other than digital block effect concerning for compartment syndrome. Tetanus updated prior to initial provider exam by triage nurse. 31 Lakeisha Pruitt consulted and will evaluate patient in the ED [KR]   1530 Evaluated patient with orthopedic consult at bedside. Plan for soaking in betadine and agreed with already administered IV antibiotic Ancef for obvious open fracture confirmed by imaging.  Nail avulsion evaluated and no emergent removal necessary per ortho and patient can be discharged on a course of oral antibiotics with bulky dressings to follow with orthopedics in one week with wound care and dressing changes to be done by intermediate medical staff and to have hand and fingers reevaluated upon return by medical staff and in the morning and continual assessment and monitoring daily. Patient agreeable. Pain adequately controlled and will be provided pain medication prescription as well. Remains without neurovascular compromise on reassessment. ED Course User Index  [KR] Marly Simpson NP       Provider Notes (Medical Decision Making):     MDM  Number of Diagnoses or Management Options     Amount and/or Complexity of Data Reviewed  Tests in the radiology section of CPT®: ordered and reviewed  Review and summarize past medical records: yes  Discuss the patient with other providers: yes  Independent visualization of images, tracings, or specimens: yes    Risk of Complications, Morbidity, and/or Mortality  Presenting problems: moderate    Patient Progress  Patient progress: improved         Disposition   Disposition: Condition stable  DC- Adult Discharges: All of the diagnostic tests were reviewed and questions answered. Diagnosis, care plan and treatment options were discussed. The patient understands the instructions and will follow up as directed. The patients results have been reviewed with them. They have been counseled regarding their diagnosis. The patient and corrections officers verbally convey understanding and agreement of the signs, symptoms, diagnosis, treatment and prognosis and additionally agrees to follow up as recommended with their PCP in 24 - 48 hours. They also agree with the care-plan and convey that all of their questions have been answered.   I have also put together some discharge instructions for them that include: 1) educational information regarding their diagnosis, 2) how to care for their diagnosis at home, as well a 3) list of reasons why they would want to return to the ED prior to their follow-up appointment, should their condition change. Discharged    DISCHARGE PLAN:  1. Cannot display discharge medications since this patient is not currently admitted. 2.   Follow-up Information     Follow up With Specialties Details Why Contact Info    Vicenta Ching PA-C Physician Assistant In 1 day  [address] [address]  [phone]      Follow up with primary care, urgent care, or this Emergency department   As needed, If symptoms worsen     [address]  In 3 days  [address] [place] [address]  [address]  [place] [address] [facility]  [phone]        3. Return to ED if worse   4. Discharge Medication List as of 5/17/2022  5:08 PM            Diagnosis     Clinical Impression:   1. Open nondisplaced fracture of distal phalanx of finger, unspecified finger, initial encounter    2. Nail avulsion, finger, initial encounter        Attestations:    Sharif Barrett NP    Please note that this dictation was completed with [employer], the [employer] voice recognition software. Quite often unanticipated grammatical, syntax, homophones, and other interpretive errors are inadvertently transcribed by the computer software. Please disregard these errors. Please excuse any errors that have escaped final proofreading. Thank you.